# Patient Record
Sex: FEMALE | Race: OTHER | URBAN - METROPOLITAN AREA
[De-identification: names, ages, dates, MRNs, and addresses within clinical notes are randomized per-mention and may not be internally consistent; named-entity substitution may affect disease eponyms.]

---

## 2021-06-01 ENCOUNTER — EMERGENCY (EMERGENCY)
Facility: HOSPITAL | Age: 56
LOS: 1 days | Discharge: HOME | End: 2021-06-01
Attending: EMERGENCY MEDICINE | Admitting: EMERGENCY MEDICINE
Payer: COMMERCIAL

## 2021-06-01 VITALS
HEIGHT: 65 IN | RESPIRATION RATE: 18 BRPM | WEIGHT: 154.98 LBS | TEMPERATURE: 99 F | SYSTOLIC BLOOD PRESSURE: 149 MMHG | DIASTOLIC BLOOD PRESSURE: 73 MMHG | OXYGEN SATURATION: 98 % | HEART RATE: 85 BPM

## 2021-06-01 DIAGNOSIS — S80.01XA CONTUSION OF RIGHT KNEE, INITIAL ENCOUNTER: ICD-10-CM
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
## 2021-06-01 DIAGNOSIS — S81.812A LACERATION WITHOUT FOREIGN BODY, LEFT LOWER LEG, INITIAL ENCOUNTER: ICD-10-CM

## 2021-06-01 PROCEDURE — 12001 RPR S/N/AX/GEN/TRNK 2.5CM/<: CPT | Mod: 59

## 2021-06-01 PROCEDURE — 73562 X-RAY EXAM OF KNEE 3: CPT | Mod: 26,RT

## 2021-06-01 PROCEDURE — 73590 X-RAY EXAM OF LOWER LEG: CPT | Mod: 26,LT

## 2021-06-01 PROCEDURE — 99284 EMERGENCY DEPT VISIT MOD MDM: CPT | Mod: 25

## 2021-06-01 RX ORDER — IBUPROFEN 200 MG
600 TABLET ORAL ONCE
Refills: 0 | Status: COMPLETED | OUTPATIENT
Start: 2021-06-01 | End: 2021-06-01

## 2021-06-01 RX ADMIN — Medication 600 MILLIGRAM(S): at 18:31

## 2021-06-01 NOTE — ED PROVIDER NOTE - CROS ED NEURO NEG
no dizziness, no weakness/no headache/no difficulty walking/imbalance/no change in level of consciousness

## 2021-06-01 NOTE — ED ADULT NURSE NOTE - NSIMPLEMENTINTERV_GEN_ALL_ED
Implemented All Universal Safety Interventions:  [place] to call system. Call bell, personal items and telephone within reach. Instruct patient to call for assistance. Room bathroom lighting operational. Non-slip footwear when patient is off stretcher. Physically safe environment: no spills, clutter or unnecessary equipment. Stretcher in lowest position, wheels locked, appropriate side rails in place.

## 2021-06-01 NOTE — ED ADULT TRIAGE NOTE - CHIEF COMPLAINT QUOTE
Pt BIBEMS complaining of trip and fall after she misstepped on curb. Pt hit right side of head on street. PT denies loc and use of blood thinners. PT with laceration to left lower leg.

## 2021-06-01 NOTE — ED PROVIDER NOTE - PHYSICAL EXAMINATION
VITAL SIGNS: I have reviewed nursing notes and confirm.  CONSTITUTIONAL: Well-developed; well-nourished; in no acute distress.  SKIN: +1.5 inch abrasion/superficial laceration to distal L anterolateral leg with no active bleeding.   HEAD: Normocephalic; atraumatic.  EYES: PERRL, EOM intact; conjunctiva and sclera clear.  ENT: No nasal discharge; airway clear.  NECK: Supple; non tender.  CARD: S1, S2 normal; no murmurs, gallops, or rubs. Regular rate and rhythm.  RESP: No wheezes, rales or rhonchi.  ABD: Normal bowel sounds; soft; non-distended; non-tender; no hepatosplenomegaly.  MSK: Normal ROM. No clubbing, cyanosis or edema. Pt is able to bear weight with pain.   NEURO: Alert, oriented. Grossly unremarkable.  PSYCH: Cooperative, appropriate. VITAL SIGNS: I have reviewed nursing notes and confirm.  CONSTITUTIONAL: Well-developed; well-nourished; in no acute distress.  SKIN: +1.5 inch abrasion/superficial laceration to distal L anterolateral leg with no active bleeding.   HEAD: Normocephalic; atraumatic.  EYES: PERRL, EOM intact; conjunctiva and sclera clear.  ENT: No nasal discharge; airway clear.  NECK: Supple; non tender.  CARD: S1, S2 normal; no murmurs, gallops, or rubs. Regular rate and rhythm.  RESP: No wheezes, rales or rhonchi.  ABD: Normal bowel sounds; soft; non-distended; non-tender;  MSK: Normal ROM. No clubbing, cyanosis or edema. Pt is able to bear weight with pain.  No cervical, thoracic ot lumbar spine tenderness to percussion or palpation. Flexion/extension of spine without pain.  compartments soft  nvi all joints FROM and with pain or tenderness with movement   NEURO: Alert, oriented. speech fluent and appropriate cooperative gait steady

## 2021-06-01 NOTE — ED PROVIDER NOTE - NSFOLLOWUPINSTRUCTIONS_ED_ALL_ED_FT
keep wound clean and dry    soap and water to area - no hydrogen peroxide    follow Dermabond instructions     follow up with your doctor in [place] this week

## 2021-06-01 NOTE — ED ADULT NURSE NOTE - OBJECTIVE STATEMENT
lle pain and headache s/p ground level fall striking head on the curb, denies loc, no s/s of distress, awaiting radiology

## 2021-06-01 NOTE — ED PROCEDURE NOTE - PROCEDURE ADDITIONAL DETAILS
pt declined sutures  Dermabond to wound w good approximation of sin edges.  pt informed most likely there will be a small scar at site

## 2021-06-01 NOTE — ED PROVIDER NOTE - OBJECTIVE STATEMENT
56 yo F w/ no pertinent PMHx presents to the ED via EMS for evaluation s/p witnessed mechanical trip and fall today. Pt states she was walking when she misstepped on a curb, fell forward, hitting her LLE and landing on her R knee. Pt presents with a laceration to her anterior L shin. Denies prodromes, LOC, HA, dizziness, SOB, CP, N/V, change in ROM/sensation, abdominal/back/neck pain, or anti-coagulant use. Pt reports pain with weight-bearing s/p fall. Tetanus is up to date. 56 yo F w/ no pertinent PMHx presents to the ED via EMS for evaluation s/p witnessed mechanical trip and fall today. Pt states she stood up from chair  lost balance, fell forward, hitting her LLE and landing on her R knee. Pt presents with a laceration to her anterior L shin. Denies prodromes, LOC, HA, dizziness, SOB, CP, N/V, change in ROM/sensation, abdominal/back/neck pain, or anti-coagulant use. Tetanus is up to date.     present

## 2021-06-01 NOTE — ED PROVIDER NOTE - PATIENT PORTAL LINK FT
You can access the FollowMyHealth Patient Portal offered by [facility] by registering at the following website: [email or web address]. By joining [facility]’s FollowMyHealth portal, you will also be able to view your health information using other applications (apps) compatible with our system.

## 2024-09-25 NOTE — ED ADULT TRIAGE NOTE - DOMESTIC TRAVEL HIGH RISK QUESTION
Recent PHQ 2/9 Score    PHQ 2:  PHQ 2 Score Adult PHQ 2 Score Adult PHQ 2 Interpretation Little interest or pleasure in activity?   9/25/2024   8:03 AM 0 No further screening needed 0       PHQ 9:  PHQ 9 Score Adult PHQ 9 Score   9/25/2024   8:03 AM 0     Most Recent VAHE 7 Score       VAHE 7 Score VAHE 7 Score   9/25/2024   8:00 AM 0       Yes

## 2025-02-11 NOTE — ED PROVIDER NOTE - SCRIBE NAME
UA indicating UTI  Urine culture showing gram-negative rods, await for finalization  Started on IV ceftriaxone, will continue for 5 days total - completed on 2/8    Eloise Ordonez